# Patient Record
Sex: MALE | Race: WHITE | Employment: FULL TIME | URBAN - NONMETROPOLITAN AREA
[De-identification: names, ages, dates, MRNs, and addresses within clinical notes are randomized per-mention and may not be internally consistent; named-entity substitution may affect disease eponyms.]

---

## 2021-05-03 ENCOUNTER — OFFICE VISIT (OUTPATIENT)
Dept: FAMILY MEDICINE CLINIC | Age: 36
End: 2021-05-03
Payer: OTHER GOVERNMENT

## 2021-05-03 VITALS
HEIGHT: 73 IN | OXYGEN SATURATION: 96 % | HEART RATE: 77 BPM | TEMPERATURE: 97.1 F | BODY MASS INDEX: 25.84 KG/M2 | SYSTOLIC BLOOD PRESSURE: 114 MMHG | RESPIRATION RATE: 18 BRPM | DIASTOLIC BLOOD PRESSURE: 78 MMHG | WEIGHT: 195 LBS

## 2021-05-03 DIAGNOSIS — U07.1 COVID-19: Primary | ICD-10-CM

## 2021-05-03 DIAGNOSIS — J01.10 ACUTE NON-RECURRENT FRONTAL SINUSITIS: ICD-10-CM

## 2021-05-03 LAB
Lab: NORMAL
PERFORMING INSTRUMENT: NORMAL
QC PASS/FAIL: NORMAL
SARS-COV-2, POC: NORMAL

## 2021-05-03 PROCEDURE — 99213 OFFICE O/P EST LOW 20 MIN: CPT | Performed by: FAMILY MEDICINE

## 2021-05-03 PROCEDURE — 87426 SARSCOV CORONAVIRUS AG IA: CPT | Performed by: FAMILY MEDICINE

## 2021-05-03 RX ORDER — METHYLPREDNISOLONE 4 MG/1
TABLET ORAL
Qty: 1 KIT | Refills: 0 | Status: SHIPPED | OUTPATIENT
Start: 2021-05-03 | End: 2022-07-26

## 2021-05-03 RX ORDER — CEFDINIR 300 MG/1
300 CAPSULE ORAL 2 TIMES DAILY
Qty: 20 CAPSULE | Refills: 0 | Status: SHIPPED | OUTPATIENT
Start: 2021-05-03 | End: 2021-05-13

## 2021-05-03 RX ORDER — CETIRIZINE HYDROCHLORIDE 10 MG/1
10 TABLET ORAL DAILY
Qty: 30 TABLET | Refills: 1 | Status: SHIPPED | OUTPATIENT
Start: 2021-05-03

## 2021-05-03 ASSESSMENT — ENCOUNTER SYMPTOMS
SINUS PAIN: 1
SINUS PRESSURE: 1
RESPIRATORY NEGATIVE: 1
EYES NEGATIVE: 1
SORE THROAT: 0
RHINORRHEA: 1

## 2021-05-03 NOTE — PROGRESS NOTES
5/3/21  Sharan Francois : 1985 Sex: male  Age: 31 y.o. Chief Complaint   Patient presents with    Nasal Congestion    Drainage       Is a 62-year-old white male with chief complaint nasal congestion drainage and dental pain secondary to the sinus pain. He has no fevers chills cough shortness of breath none loss of taste or smell no nausea vomiting or diarrhea. Review of Systems   Constitutional: Positive for fatigue. HENT: Positive for congestion, postnasal drip, rhinorrhea, sinus pressure and sinus pain. Negative for sneezing and sore throat. Eyes: Negative. Respiratory: Negative. Cardiovascular: Negative. Current Outpatient Medications:     cetirizine (ZYRTEC) 10 MG tablet, Take 1 tablet by mouth daily, Disp: 30 tablet, Rfl: 1    methylPREDNISolone (MEDROL DOSEPACK) 4 MG tablet, Take by mouth., Disp: 1 kit, Rfl: 0    cefdinir (OMNICEF) 300 MG capsule, Take 1 capsule by mouth 2 times daily for 10 days, Disp: 20 capsule, Rfl: 0  No Known Allergies    No past medical history on file. No past surgical history on file. No family history on file. Social History     Tobacco Use    Smoking status: Not on file   Substance Use Topics    Alcohol use: Not on file    Drug use: Not on file        Vitals:    21 1459   BP: 114/78   Pulse: 77   Resp: 18   Temp: 97.1 °F (36.2 °C)   SpO2: 96%   Weight: 195 lb (88.5 kg)   Height: 6' 1\" (1.854 m)       Physical Exam  Vitals signs reviewed. HENT:      Head: Normocephalic and atraumatic. Right Ear: Tympanic membrane, ear canal and external ear normal. There is no impacted cerumen. Left Ear: Tympanic membrane, ear canal and external ear normal. There is no impacted cerumen. Nose: Congestion and rhinorrhea present. Mouth/Throat:      Mouth: Mucous membranes are moist.      Pharynx: Oropharynx is clear. No oropharyngeal exudate or posterior oropharyngeal erythema.    Eyes:      Conjunctiva/sclera: Conjunctivae normal.   Cardiovascular:      Rate and Rhythm: Normal rate and regular rhythm. Heart sounds: No murmur. Pulmonary:      Effort: Pulmonary effort is normal.      Breath sounds: Normal breath sounds. Lymphadenopathy:      Cervical: No cervical adenopathy. Neurological:      Mental Status: He is alert. Assessment and Plan:  Jd Laws was seen today for nasal congestion and drainage. Diagnoses and all orders for this visit:    COVID-19  -     POCT COVID-19, Antigen  -     COVID-19 Ambulatory    Acute non-recurrent frontal sinusitis    Other orders  -     cetirizine (ZYRTEC) 10 MG tablet; Take 1 tablet by mouth daily  -     methylPREDNISolone (MEDROL DOSEPACK) 4 MG tablet; Take by mouth. -     cefdinir (OMNICEF) 300 MG capsule; Take 1 capsule by mouth 2 times daily for 10 days        Orders Placed This Encounter   Medications    cetirizine (ZYRTEC) 10 MG tablet     Sig: Take 1 tablet by mouth daily     Dispense:  30 tablet     Refill:  1    methylPREDNISolone (MEDROL DOSEPACK) 4 MG tablet     Sig: Take by mouth. Dispense:  1 kit     Refill:  0    cefdinir (OMNICEF) 300 MG capsule     Sig: Take 1 capsule by mouth 2 times daily for 10 days     Dispense:  20 capsule     Refill:  0        Patient advised to follow up with PCP as needed. Seen By:  Naila Gallo, DO  His COVID-19 rapid swab was negative we did a PCR on him and treated him for acute sinusitis further treatment is pending his PCR. We told him he has to isolate until we have a negative PCR.

## 2021-05-05 LAB
SARS-COV-2: NOT DETECTED
SOURCE: NORMAL

## 2022-05-08 ENCOUNTER — APPOINTMENT (OUTPATIENT)
Dept: GENERAL RADIOLOGY | Age: 37
End: 2022-05-08
Payer: OTHER GOVERNMENT

## 2022-05-08 ENCOUNTER — HOSPITAL ENCOUNTER (EMERGENCY)
Age: 37
Discharge: HOME | End: 2022-05-08
Attending: STUDENT IN AN ORGANIZED HEALTH CARE EDUCATION/TRAINING PROGRAM
Payer: OTHER GOVERNMENT

## 2022-05-08 VITALS
RESPIRATION RATE: 14 BRPM | DIASTOLIC BLOOD PRESSURE: 83 MMHG | WEIGHT: 205 LBS | HEIGHT: 73 IN | TEMPERATURE: 97.5 F | OXYGEN SATURATION: 99 % | SYSTOLIC BLOOD PRESSURE: 138 MMHG | HEART RATE: 80 BPM | BODY MASS INDEX: 27.17 KG/M2

## 2022-05-08 DIAGNOSIS — L03.113 CELLULITIS OF HAND, RIGHT: Primary | ICD-10-CM

## 2022-05-08 LAB
ANION GAP SERPL CALCULATED.3IONS-SCNC: 9 MMOL/L (ref 7–16)
BASOPHILS ABSOLUTE: 0.03 E9/L (ref 0–0.2)
BASOPHILS RELATIVE PERCENT: 0.4 % (ref 0–2)
BUN BLDV-MCNC: 19 MG/DL (ref 6–20)
CALCIUM SERPL-MCNC: 9.3 MG/DL (ref 8.6–10.2)
CHLORIDE BLD-SCNC: 104 MMOL/L (ref 98–107)
CO2: 26 MMOL/L (ref 22–29)
CREAT SERPL-MCNC: 1.1 MG/DL (ref 0.7–1.2)
EOSINOPHILS ABSOLUTE: 0.37 E9/L (ref 0.05–0.5)
EOSINOPHILS RELATIVE PERCENT: 4.3 % (ref 0–6)
GFR AFRICAN AMERICAN: >60
GFR NON-AFRICAN AMERICAN: >60 ML/MIN/1.73
GLUCOSE BLD-MCNC: 99 MG/DL (ref 74–99)
HCT VFR BLD CALC: 46.3 % (ref 37–54)
HEMOGLOBIN: 15.8 G/DL (ref 12.5–16.5)
IMMATURE GRANULOCYTES #: 0.03 E9/L
IMMATURE GRANULOCYTES %: 0.4 % (ref 0–5)
LACTIC ACID, SEPSIS: 1.2 MMOL/L (ref 0.5–1.9)
LYMPHOCYTES ABSOLUTE: 1.78 E9/L (ref 1.5–4)
LYMPHOCYTES RELATIVE PERCENT: 20.9 % (ref 20–42)
MCH RBC QN AUTO: 30.5 PG (ref 26–35)
MCHC RBC AUTO-ENTMCNC: 34.1 % (ref 32–34.5)
MCV RBC AUTO: 89.4 FL (ref 80–99.9)
MONOCYTES ABSOLUTE: 0.73 E9/L (ref 0.1–0.95)
MONOCYTES RELATIVE PERCENT: 8.6 % (ref 2–12)
NEUTROPHILS ABSOLUTE: 5.57 E9/L (ref 1.8–7.3)
NEUTROPHILS RELATIVE PERCENT: 65.4 % (ref 43–80)
PDW BLD-RTO: 12.5 FL (ref 11.5–15)
PLATELET # BLD: 149 E9/L (ref 130–450)
PMV BLD AUTO: 11.2 FL (ref 7–12)
POTASSIUM REFLEX MAGNESIUM: 4.3 MMOL/L (ref 3.5–5)
RBC # BLD: 5.18 E12/L (ref 3.8–5.8)
SODIUM BLD-SCNC: 139 MMOL/L (ref 132–146)
WBC # BLD: 8.5 E9/L (ref 4.5–11.5)

## 2022-05-08 PROCEDURE — 87040 BLOOD CULTURE FOR BACTERIA: CPT

## 2022-05-08 PROCEDURE — 90471 IMMUNIZATION ADMIN: CPT | Performed by: STUDENT IN AN ORGANIZED HEALTH CARE EDUCATION/TRAINING PROGRAM

## 2022-05-08 PROCEDURE — 80048 BASIC METABOLIC PNL TOTAL CA: CPT

## 2022-05-08 PROCEDURE — 99284 EMERGENCY DEPT VISIT MOD MDM: CPT

## 2022-05-08 PROCEDURE — 73130 X-RAY EXAM OF HAND: CPT

## 2022-05-08 PROCEDURE — 85025 COMPLETE CBC W/AUTO DIFF WBC: CPT

## 2022-05-08 PROCEDURE — 83605 ASSAY OF LACTIC ACID: CPT

## 2022-05-08 PROCEDURE — 90714 TD VACC NO PRESV 7 YRS+ IM: CPT | Performed by: STUDENT IN AN ORGANIZED HEALTH CARE EDUCATION/TRAINING PROGRAM

## 2022-05-08 PROCEDURE — 6360000002 HC RX W HCPCS: Performed by: STUDENT IN AN ORGANIZED HEALTH CARE EDUCATION/TRAINING PROGRAM

## 2022-05-08 RX ORDER — DOXYCYCLINE HYCLATE 100 MG
100 TABLET ORAL 2 TIMES DAILY
Qty: 14 TABLET | Refills: 0 | Status: SHIPPED | OUTPATIENT
Start: 2022-05-08 | End: 2022-05-15

## 2022-05-08 RX ORDER — TETANUS AND DIPHTHERIA TOXOIDS ADSORBED 2; 2 [LF]/.5ML; [LF]/.5ML
0.5 INJECTION INTRAMUSCULAR ONCE
Status: COMPLETED | OUTPATIENT
Start: 2022-05-08 | End: 2022-05-08

## 2022-05-08 RX ADMIN — TETANUS AND DIPHTHERIA TOXOIDS ADSORBED 0.5 ML: 2; 2 INJECTION INTRAMUSCULAR at 12:10

## 2022-05-08 ASSESSMENT — ENCOUNTER SYMPTOMS
WHEEZING: 0
ABDOMINAL PAIN: 0
SINUS PRESSURE: 0
SORE THROAT: 0
EYE PAIN: 0
BACK PAIN: 0
NAUSEA: 0
VOMITING: 0
DIARRHEA: 0
COUGH: 0
SHORTNESS OF BREATH: 0
EYE DISCHARGE: 0

## 2022-05-08 NOTE — ED PROVIDER NOTES
29-year-old male presenting emerged part for wound check. He went to a dermatologist 3 days ago to get an injection in his right hand as he has some warts there, and yesterday began developing some on and redness and edema, went to an urgent care and was given a prescription for Bactrim and Keflex, markings were made and he was instructed to return to the emergency department if the erythema seemed to spread past the markings. Patient states the streaking and swelling has gotten better, but he noted the erythema seem to spread a little bit which is why he came to the emergency department today. Denies any fevers, chills, nausea, vomiting, diarrhea, chest pain, chest tightness, shortness of breath, cough, congestion. Right hand erythema and swelling began yesterday, has improved for the most part, seems to improve with elevation of his hand along with taking his antibiotics, worsened by nothing, moderate in severity, has been persistent. Review of Systems   Constitutional: Negative for chills and fever. HENT: Negative for ear pain, sinus pressure and sore throat. Eyes: Negative for pain and discharge. Respiratory: Negative for cough, shortness of breath and wheezing. Cardiovascular: Negative for chest pain. Gastrointestinal: Negative for abdominal pain, diarrhea, nausea and vomiting. Genitourinary: Negative for dysuria and frequency. Musculoskeletal: Negative for arthralgias and back pain. Skin: Positive for wound (Right hand erythema, cellulitis). Negative for rash. Neurological: Negative for weakness and headaches. Hematological: Negative for adenopathy. All other systems reviewed and are negative. Physical Exam  Vitals and nursing note reviewed. Constitutional:       Appearance: He is well-developed. HENT:      Head: Normocephalic and atraumatic.    Eyes:      Conjunctiva/sclera: Conjunctivae normal.   Cardiovascular:      Rate and Rhythm: Normal rate and regular rhythm. Pulses: Normal pulses. Heart sounds: Normal heart sounds. No murmur heard. Pulmonary:      Effort: Pulmonary effort is normal. No respiratory distress. Breath sounds: Normal breath sounds. No wheezing or rales. Abdominal:      General: Bowel sounds are normal.      Palpations: Abdomen is soft. Tenderness: There is no abdominal tenderness. There is no guarding or rebound. Musculoskeletal:         General: Swelling present. No tenderness or deformity. Normal range of motion. Cervical back: Normal range of motion and neck supple. Comments: Mild right hand erythema, there is no warmth, no fluctuance, no induration, no crepitus, no streaking visible, the erythema of the apparent cellulitis has spread around 1 cm past the markings from yesterday in an area that he said had streaks the day before, streaking is no longer present. Patient is also complaining of right armpit discomfort but states he was elevating his arm for over 20 hours, no visible deformity, no tenderness, no lymphadenopathy in the axilla   Skin:     General: Skin is warm and dry. Neurological:      Mental Status: He is alert and oriented to person, place, and time. Cranial Nerves: No cranial nerve deficit. Coordination: Coordination normal.      Comments: Normal pulses, right hand neurovascularly intact          Procedures     MDM     ED Course as of 05/08/22 1326   Sun May 08, 2022   1048 ATTENDING PROVIDER ATTESTATION:     I have personally performed and/or participated in the history, exam, medical decision making, and procedures and agree with all pertinent clinical information unless otherwise noted. I have also reviewed and agree with the past medical, family and social history unless otherwise noted. I have discussed this patient in detail with the resident, and provided the instruction and education regarding the patient.   My findings/plan:   -year-old male with no segment past medical history presenting for evaluation of hand swelling and discomfort. Patient notes that he saw dermatology, had something injected in his hand to help with warts however started as infection. He went to urgent care, was started on Keflex and Bactrim for this. They outlined the area, he notes it seems like it has spread so he presented for further evaluation treatment. He states he is only been on antibiotics for 20 hours at this point time. On exam he is sitting up in bed no acute distress. He is neurovascular intact. Plan for labs, imaging, supportive care. [BB]   1156 No visualized foreign body or signs of vasculitis on hand x-ray, patient is nontoxic-appearing, is not diabetic, does not have a significant leukocytosis, does not have any systemic symptoms consistent with severe cellulitis. Will discharge patient, change Bactrim to doxycycline, return precautions given. [JG]   152 82-year-old male presenting emergency department right hand cellulitis, was concerned today worsened compared to yesterday when he went to an urgent care and received some antibiotics. Erythema and swelling appeared greatly improved based off of description he was describing, he states the swelling had improved, there was a small amount of erythema past the borders that were drawn at the urgent care, but streaking was no longer present, there is no palpable crepitus, induration, fluctuance, no significant warmth, was neurovascularly intact in that hand. Given he had injections in the hand the dermatology office obtain x-ray to rule out foreign body, obtain blood work which did not reveal any evidence of serious underlying infection, patient was afebrile during emergency department stay, his tetanus was updated, his Bactrim was changed to doxycycline prescription, patient was discharged, return precautions given, instructed follow-up with primary care physician.  [JG]      ED Course User Index  [BB] Beatriz Pan, DO  [JG] Johnny Acuña MD            65-year-old male presenting emergency department right hand cellulitis, was concerned today worsened compared to yesterday when he went to an urgent care and received some antibiotics. Erythema and swelling appeared greatly improved based off of description he was describing, he states the swelling had improved, there was a small amount of erythema past the borders that were drawn at the urgent care, but streaking was no longer present, there is no palpable crepitus, induration, fluctuance, no significant warmth, was neurovascularly intact in that hand. Given he had injections in the hand the dermatology office obtain x-ray to rule out foreign body, obtain blood work which did not reveal any evidence of serious underlying infection, patient was afebrile during emergency department stay, his tetanus was updated, his Bactrim was changed to doxycycline prescription, patient was discharged, return precautions given, instructed follow-up with primary care physician. ED Course as of 05/08/22 1326   Sun May 08, 2022   1048 ATTENDING PROVIDER ATTESTATION:     I have personally performed and/or participated in the history, exam, medical decision making, and procedures and agree with all pertinent clinical information unless otherwise noted. I have also reviewed and agree with the past medical, family and social history unless otherwise noted. I have discussed this patient in detail with the resident, and provided the instruction and education regarding the patient. My findings/plan:   -year-old male with no segment past medical history presenting for evaluation of hand swelling and discomfort. Patient notes that he saw dermatology, had something injected in his hand to help with warts however started as infection. He went to urgent care, was started on Keflex and Bactrim for this.   They outlined the area, he notes it seems like it has spread so he presented for further evaluation treatment. He states he is only been on antibiotics for 20 hours at this point time. On exam he is sitting up in bed no acute distress. He is neurovascular intact. Plan for labs, imaging, supportive care. [BB]   1156 No visualized foreign body or signs of vasculitis on hand x-ray, patient is nontoxic-appearing, is not diabetic, does not have a significant leukocytosis, does not have any systemic symptoms consistent with severe cellulitis. Will discharge patient, change Bactrim to doxycycline, return precautions given. [JG]   152 44-year-old male presenting emergency department right hand cellulitis, was concerned today worsened compared to yesterday when he went to an urgent care and received some antibiotics. Erythema and swelling appeared greatly improved based off of description he was describing, he states the swelling had improved, there was a small amount of erythema past the borders that were drawn at the urgent care, but streaking was no longer present, there is no palpable crepitus, induration, fluctuance, no significant warmth, was neurovascularly intact in that hand. Given he had injections in the hand the dermatology office obtain x-ray to rule out foreign body, obtain blood work which did not reveal any evidence of serious underlying infection, patient was afebrile during emergency department stay, his tetanus was updated, his Bactrim was changed to doxycycline prescription, patient was discharged, return precautions given, instructed follow-up with primary care physician. [JG]      ED Course User Index  [BB] Alexander Oliva DO  [JG] Rj Piedra MD       --------------------------------------------- PAST HISTORY ---------------------------------------------  Past Medical History:  has no past medical history on file. Past Surgical History:  has no past surgical history on file. Social History:      Family History: family history is not on file.      The patients home medications have been reviewed. Allergies: Patient has no known allergies. -------------------------------------------------- RESULTS -------------------------------------------------  Labs:  Results for orders placed or performed during the hospital encounter of 05/08/22   CBC with Auto Differential   Result Value Ref Range    WBC 8.5 4.5 - 11.5 E9/L    RBC 5.18 3.80 - 5.80 E12/L    Hemoglobin 15.8 12.5 - 16.5 g/dL    Hematocrit 46.3 37.0 - 54.0 %    MCV 89.4 80.0 - 99.9 fL    MCH 30.5 26.0 - 35.0 pg    MCHC 34.1 32.0 - 34.5 %    RDW 12.5 11.5 - 15.0 fL    Platelets 263 021 - 802 E9/L    MPV 11.2 7.0 - 12.0 fL    Neutrophils % 65.4 43.0 - 80.0 %    Immature Granulocytes % 0.4 0.0 - 5.0 %    Lymphocytes % 20.9 20.0 - 42.0 %    Monocytes % 8.6 2.0 - 12.0 %    Eosinophils % 4.3 0.0 - 6.0 %    Basophils % 0.4 0.0 - 2.0 %    Neutrophils Absolute 5.57 1.80 - 7.30 E9/L    Immature Granulocytes # 0.03 E9/L    Lymphocytes Absolute 1.78 1.50 - 4.00 E9/L    Monocytes Absolute 0.73 0.10 - 0.95 E9/L    Eosinophils Absolute 0.37 0.05 - 0.50 E9/L    Basophils Absolute 0.03 0.00 - 0.20 F7/K   Basic Metabolic Panel w/ Reflex to MG   Result Value Ref Range    Sodium 139 132 - 146 mmol/L    Potassium reflex Magnesium 4.3 3.5 - 5.0 mmol/L    Chloride 104 98 - 107 mmol/L    CO2 26 22 - 29 mmol/L    Anion Gap 9 7 - 16 mmol/L    Glucose 99 74 - 99 mg/dL    BUN 19 6 - 20 mg/dL    CREATININE 1.1 0.7 - 1.2 mg/dL    GFR Non-African American >60 >=60 mL/min/1.73    GFR African American >60     Calcium 9.3 8.6 - 10.2 mg/dL   Lactate, Sepsis   Result Value Ref Range    Lactic Acid, Sepsis 1.2 0.5 - 1.9 mmol/L       Radiology:  XR HAND RIGHT (MIN 3 VIEWS)   Final Result   No acute osseous abnormality. Soft tissue swelling at the dorsal aspect of the hand. No radiographic   evidence of osteomyelitis. No radiopaque foreign body seen.              ------------------------- NURSING NOTES AND VITALS REVIEWED ---------------------------  Date / Time Roomed:  5/8/2022 10:15 AM  ED Bed Assignment:  27/27    The nursing notes within the ED encounter and vital signs as below have been reviewed. /83   Pulse 80   Temp 97.5 °F (36.4 °C) (Temporal)   Resp 14   Ht 6' 1\" (1.854 m)   Wt 205 lb (93 kg)   SpO2 99%   BMI 27.05 kg/m²   Oxygen Saturation Interpretation: Normal      ------------------------------------------ PROGRESS NOTES ------------------------------------------  1:26 PM EDT  I have spoken with the patient and discussed todays results, in addition to providing specific details for the plan of care and counseling regarding the diagnosis and prognosis. Their questions are answered at this time and they are agreeable with the plan. I discussed at length with them reasons for immediate return here for re evaluation. They will followup with their primary care physician by calling their office tomorrow. --------------------------------- ADDITIONAL PROVIDER NOTES ---------------------------------  At this time the patient is without objective evidence of an acute process requiring hospitalization or inpatient management. They have remained hemodynamically stable throughout their entire ED visit and are stable for discharge with outpatient follow-up. The plan has been discussed in detail and they are aware of the specific conditions for emergent return, as well as the importance of follow-up. Discharge Medication List as of 5/8/2022 11:58 AM      START taking these medications    Details   doxycycline hyclate (VIBRA-TABS) 100 MG tablet Take 1 tablet by mouth 2 times daily for 7 days, Disp-14 tablet, R-0Normal             Diagnosis:  1. Cellulitis of hand, right        Disposition:  Patient's disposition: Discharge to home  Patient's condition is stable.          Kim Petit MD  Resident  05/08/22 8683

## 2022-05-13 LAB
BLOOD CULTURE, ROUTINE: NORMAL
CULTURE, BLOOD 2: NORMAL

## 2022-06-20 ENCOUNTER — TELEPHONE (OUTPATIENT)
Dept: FAMILY MEDICINE CLINIC | Age: 37
End: 2022-06-20

## 2022-06-20 NOTE — TELEPHONE ENCOUNTER
----- Message from Raquel Grey sent at 2022 10:18 AM EDT -----  Subject: Appointment Request    Reason for Call: New Patient Request Appointment    QUESTIONS  Type of Appointment? New Patient/New to Provider  Reason for appointment request? No appointments available during search  Additional Information for Provider? Patient would like to establish care   with Demetria Arroyo if possible. Patient is also open to establish care   with any male doctor with in the [facility]. Please reach out to the   patient to schedule.   ---------------------------------------------------------------------------  --------------  CALL BACK INFO  What is the best way for the office to contact you? OK to leave message on   voicemail  Preferred Call Back Phone Number? [phone]  ---------------------------------------------------------------------------  --------------  SCRIPT ANSWERS  Relationship to Patient? Self  Specialty Confirmation? Primary Care  Is this the first appointment to establish care for a ? No  Have you been diagnosed with COVID-19 in the past 10 days? No  (Service Expert  click yes below to proceed with [employer] As Usual   Scheduling)?  Yes

## 2022-07-26 ENCOUNTER — OFFICE VISIT (OUTPATIENT)
Dept: FAMILY MEDICINE CLINIC | Age: 37
End: 2022-07-26
Payer: OTHER GOVERNMENT

## 2022-07-26 VITALS
OXYGEN SATURATION: 98 % | SYSTOLIC BLOOD PRESSURE: 100 MMHG | DIASTOLIC BLOOD PRESSURE: 80 MMHG | BODY MASS INDEX: 27.3 KG/M2 | HEIGHT: 73 IN | RESPIRATION RATE: 18 BRPM | HEART RATE: 60 BPM | WEIGHT: 206 LBS | TEMPERATURE: 97.8 F

## 2022-07-26 DIAGNOSIS — Z00.00 ROUTINE GENERAL MEDICAL EXAMINATION AT A HEALTH CARE FACILITY: Primary | ICD-10-CM

## 2022-07-26 PROCEDURE — 99385 PREV VISIT NEW AGE 18-39: CPT | Performed by: INTERNAL MEDICINE

## 2022-07-26 SDOH — ECONOMIC STABILITY: FOOD INSECURITY: WITHIN THE PAST 12 MONTHS, THE FOOD YOU BOUGHT JUST DIDN'T LAST AND YOU DIDN'T HAVE MONEY TO GET MORE.: PATIENT DECLINED

## 2022-07-26 SDOH — ECONOMIC STABILITY: FOOD INSECURITY: WITHIN THE PAST 12 MONTHS, YOU WORRIED THAT YOUR FOOD WOULD RUN OUT BEFORE YOU GOT MONEY TO BUY MORE.: PATIENT DECLINED

## 2022-07-26 ASSESSMENT — ENCOUNTER SYMPTOMS
SHORTNESS OF BREATH: 0
BLOOD IN STOOL: 0
CHEST TIGHTNESS: 0
SORE THROAT: 0
EYE PAIN: 0
ABDOMINAL PAIN: 0
COUGH: 0
DIARRHEA: 0
NAUSEA: 0
VOMITING: 0
CONSTIPATION: 0
RHINORRHEA: 0

## 2022-07-26 ASSESSMENT — PATIENT HEALTH QUESTIONNAIRE - PHQ9
SUM OF ALL RESPONSES TO PHQ QUESTIONS 1-9: 0
SUM OF ALL RESPONSES TO PHQ QUESTIONS 1-9: 0
2. FEELING DOWN, DEPRESSED OR HOPELESS: 0
SUM OF ALL RESPONSES TO PHQ QUESTIONS 1-9: 0
SUM OF ALL RESPONSES TO PHQ9 QUESTIONS 1 & 2: 0
SUM OF ALL RESPONSES TO PHQ QUESTIONS 1-9: 0
1. LITTLE INTEREST OR PLEASURE IN DOING THINGS: 0

## 2022-07-26 ASSESSMENT — SOCIAL DETERMINANTS OF HEALTH (SDOH): HOW HARD IS IT FOR YOU TO PAY FOR THE VERY BASICS LIKE FOOD, HOUSING, MEDICAL CARE, AND HEATING?: PATIENT DECLINED

## 2023-01-05 ENCOUNTER — TELEPHONE (OUTPATIENT)
Dept: PRIMARY CARE CLINIC | Age: 38
End: 2023-01-05

## 2023-01-05 NOTE — TELEPHONE ENCOUNTER
I spoke with Pt and relayed Dr. Peyton Pino message. His insurance has changed to [employer]. He would like to change providers. He will call back.

## 2023-01-05 NOTE — TELEPHONE ENCOUNTER
----- Message from Susy Cabrera sent at 1/4/2023  3:46 PM EST -----  Subject: Appointment Request    Reason for Call: New Patient/New to Provider Appointment needed: New   Patient Request Appointment    QUESTIONS    Reason for appointment request? Requested Provider unavailable - Dr. Kimberly Singh     Additional Information for Provider? Robbin Maldonado is needing to establish   care with Dr. Kimberly Singh. He is a former patient of Dr. Luca Go. There were no   available appointments.   ---------------------------------------------------------------------------  --------------  Dee Powell VYHQ  [phone]; OK to leave message on voicemail  ---------------------------------------------------------------------------  --------------  SCRIPT ANSWERS  PARAG Screen: Allen Tomlin

## 2023-02-17 ENCOUNTER — OFFICE VISIT (OUTPATIENT)
Dept: SURGERY | Age: 38
End: 2023-02-17
Payer: OTHER GOVERNMENT

## 2023-02-17 VITALS
OXYGEN SATURATION: 99 % | SYSTOLIC BLOOD PRESSURE: 113 MMHG | BODY MASS INDEX: 26.9 KG/M2 | WEIGHT: 203 LBS | DIASTOLIC BLOOD PRESSURE: 72 MMHG | HEART RATE: 41 BPM | HEIGHT: 73 IN

## 2023-02-17 DIAGNOSIS — K62.5 BRIGHT RED BLOOD PER RECTUM: Primary | ICD-10-CM

## 2023-02-17 PROCEDURE — 99203 OFFICE O/P NEW LOW 30 MIN: CPT | Performed by: SURGERY

## 2023-02-17 NOTE — PROGRESS NOTES
[facility] Clinic Note    Assessment/Plan:      Diagnosis Orders   1. Bright red blood per rectum      Had colonoscopy 2-3 years ago for same which just showed hemorrhoids. Likely benign. Monitor given has resolved for 3 months. If recurs consider colonoscopy            Return if symptoms worsen or fail to improve. Followup offered, but would just like to call. .      Chief Complaint   Patient presents with    New Patient     Ref from dr Des Montoya for rectal bleeding       PCP: Maria C Batista MD    HPI: Jessy Glass is a 41 y.o. male who presents in consultation for rectal bleeding. He had it intermittently for a couple of months. Stopped 3 months ago without any recurrence. He did have a prior colonoscopy for rectal bleeding around 2 to 3 years ago. This was with Dr. Virginie Thomas he states. He only had internal hemorrhoids noted. He denies any rectal pain. He has no issues moving his bowels. He has no melena. He says it is generally just when he wipes on the toilet tissue. He did notice in the toilet bowl once. He denies any abdominal pain or unintentional weight loss. Maternal grandmother has a history of colon cancer. There is no family history of inflammatory bowel disease. History reviewed. No pertinent past medical history. Past Surgical History:   Procedure Laterality Date    COLONOSCOPY      NASAL FRACTURE SURGERY      WISDOM TOOTH EXTRACTION         Prior to Admission medications    Medication Sig Start Date End Date Taking?  Authorizing Provider   FINASTERIDE PO Take by mouth   Yes Historical Provider, MD   cetirizine (ZYRTEC) 10 MG tablet Take 1 tablet by mouth daily 5/3/21  Yes Pamela Dhillon, DO       No Known Allergies    Social History     Socioeconomic History    Marital status:      Spouse name: None    Number of children: 2    Years of education: None    Highest education level: None   Tobacco Use    Smoking status: Never    Smokeless tobacco: Never   Vaping Use Vaping Use: Never used   Substance and Sexual Activity    Alcohol use: Yes     Alcohol/week: 2.0 standard drinks     Types: 2 Cans of beer per week    Drug use: Never    Sexual activity: Yes     Partners: Female     Social Determinants of Health     Financial Resource Strain: Unknown    Difficulty of Paying Living Expenses: Patient refused   Food Insecurity: Unknown    Worried About Running Out of Food in the Last Year: Patient refused    Ran Out of Food in the Last Year: Patient refused       Family History   Problem Relation Age of Onset    Thyroid Disease Mother     No Known Problems Father     No Known Problems Sister     No Known Problems Sister     No Known Problems Sister     No Known Problems Brother     Colon Cancer Maternal Grandmother     Diabetes type 2  Maternal Grandmother     Heart Disease Maternal Grandmother     Prostate Cancer Maternal Grandfather     Dementia Paternal Grandmother     Prostate Cancer Paternal Grandfather        Review of Systems   All other systems reviewed and are negative. Objective:  Vitals:    02/17/23 1103   BP: 113/72   Pulse: (!) 41   SpO2: 99%   Weight: 203 lb (92.1 kg)   Height: 6' 1\" (1.854 m)          Physical Exam  Constitutional:       General: He is not in acute distress. Appearance: He is not diaphoretic. HENT:      Head: Normocephalic and atraumatic. Eyes:      General:         Right eye: No discharge. Left eye: No discharge. Neck:      Trachea: No tracheal deviation. Cardiovascular:      Rate and Rhythm: Normal rate. Pulmonary:      Effort: Pulmonary effort is normal. No respiratory distress. Abdominal:      General: There is no distension. Palpations: Abdomen is soft. Tenderness: There is no abdominal tenderness. There is no guarding or rebound. Genitourinary:     Comments: Normal aside from small internal hemorrhoids. Skin:     General: Skin is warm and dry.    Neurological:      Mental Status: He is alert and oriented to person, place, and time. Valeriy oGins MD    I have examined the patient and performed the key aspects of physical exam, reviewed the record (including all pertinent and new radiology images and laboratory findings, referring provider notes and results), and discussed the case with the primary and referring provider where applicable. NOTE: This report, in part or full,may have been transcribed using voice recognition software. Every effort was made to ensure accuracy; however, inadvertent computerized transcription errors may be present. Please excuse any transcriptional grammatical or spelling errors that may have escaped my editorial review.     CC: Desirae Steven MD

## 2023-08-03 ENCOUNTER — OFFICE VISIT (OUTPATIENT)
Dept: FAMILY MEDICINE CLINIC | Age: 38
End: 2023-08-03
Payer: OTHER GOVERNMENT

## 2023-08-03 VITALS
OXYGEN SATURATION: 99 % | HEART RATE: 79 BPM | DIASTOLIC BLOOD PRESSURE: 78 MMHG | SYSTOLIC BLOOD PRESSURE: 130 MMHG | TEMPERATURE: 98.1 F | RESPIRATION RATE: 18 BRPM | HEIGHT: 73 IN | BODY MASS INDEX: 27.7 KG/M2 | WEIGHT: 209 LBS

## 2023-08-03 DIAGNOSIS — J01.40 ACUTE NON-RECURRENT PANSINUSITIS: Primary | ICD-10-CM

## 2023-08-03 DIAGNOSIS — R35.0 URINARY FREQUENCY: ICD-10-CM

## 2023-08-03 DIAGNOSIS — J02.0 STREP PHARYNGITIS: ICD-10-CM

## 2023-08-03 LAB
BILIRUBIN, POC: NORMAL
BLOOD URINE, POC: NORMAL
CLARITY, POC: CLEAR
COLOR, POC: CLEAR
GLUCOSE URINE, POC: NORMAL
KETONES, POC: NORMAL
LEUKOCYTE EST, POC: NORMAL
Lab: 1359
NITRITE, POC: NORMAL
PERFORMING INSTRUMENT: NORMAL
PH, POC: 6.5
PROTEIN, POC: NORMAL
QC PASS/FAIL: NORMAL
S PYO AG THROAT QL: POSITIVE
SARS-COV-2, POC: NORMAL
SPECIFIC GRAVITY, POC: 1.01
UROBILINOGEN, POC: 0.2

## 2023-08-03 PROCEDURE — 87880 STREP A ASSAY W/OPTIC: CPT | Performed by: PHYSICIAN ASSISTANT

## 2023-08-03 PROCEDURE — 99213 OFFICE O/P EST LOW 20 MIN: CPT | Performed by: PHYSICIAN ASSISTANT

## 2023-08-03 PROCEDURE — 81002 URINALYSIS NONAUTO W/O SCOPE: CPT | Performed by: PHYSICIAN ASSISTANT

## 2023-08-03 PROCEDURE — 87426 SARSCOV CORONAVIRUS AG IA: CPT | Performed by: PHYSICIAN ASSISTANT

## 2023-08-03 RX ORDER — CEFDINIR 300 MG/1
300 CAPSULE ORAL 2 TIMES DAILY
Qty: 20 CAPSULE | Refills: 0 | Status: SHIPPED | OUTPATIENT
Start: 2023-08-03 | End: 2023-08-13

## 2023-08-03 SDOH — ECONOMIC STABILITY: HOUSING INSECURITY
IN THE LAST 12 MONTHS, WAS THERE A TIME WHEN YOU DID NOT HAVE A STEADY PLACE TO SLEEP OR SLEPT IN A SHELTER (INCLUDING NOW)?: NO

## 2023-08-03 SDOH — ECONOMIC STABILITY: INCOME INSECURITY: HOW HARD IS IT FOR YOU TO PAY FOR THE VERY BASICS LIKE FOOD, HOUSING, MEDICAL CARE, AND HEATING?: NOT HARD AT ALL

## 2023-08-03 SDOH — ECONOMIC STABILITY: FOOD INSECURITY: WITHIN THE PAST 12 MONTHS, YOU WORRIED THAT YOUR FOOD WOULD RUN OUT BEFORE YOU GOT MONEY TO BUY MORE.: NEVER TRUE

## 2023-08-03 SDOH — ECONOMIC STABILITY: FOOD INSECURITY: WITHIN THE PAST 12 MONTHS, THE FOOD YOU BOUGHT JUST DIDN'T LAST AND YOU DIDN'T HAVE MONEY TO GET MORE.: NEVER TRUE

## 2023-08-03 ASSESSMENT — PATIENT HEALTH QUESTIONNAIRE - PHQ9
SUM OF ALL RESPONSES TO PHQ9 QUESTIONS 1 & 2: 0
1. LITTLE INTEREST OR PLEASURE IN DOING THINGS: 0
SUM OF ALL RESPONSES TO PHQ QUESTIONS 1-9: 0
2. FEELING DOWN, DEPRESSED OR HOPELESS: 0
SUM OF ALL RESPONSES TO PHQ QUESTIONS 1-9: 0

## 2023-08-03 NOTE — PROGRESS NOTES
Chief Complaint       Cough (X 3 days, subjective fever, productive cough, Sudafed OTC) and Sinusitis      History of Present Illness   Source of history provided by: patient. Qi Floyd is a 41 y.o. old male presenting to the walk in clinic for evaluation of a productive cough, chest congestion, nasal congestion, sinus pressure, sore throat, and body aches which have been present for the past 3 days. Patient reports a subjective fever at home as well. Denies any loss of taste or smell, CP, dyspnea, LE edema, abdominal pain, vomiting, rash, or lethargy. Denies any hx of asthma. Patient denies recent sick exposures. Patient has been vaccinated for COVID-19. Patient has been taking Sudafed OTC without symptomatic relief. Patient is also complaining of urinary frequency for the past few days. Denies any dysuria or hematuria. Denies any concern for STDs. ROS    Unless otherwise stated in this report or unable to obtain because of the patient's clinical or mental status as evidenced by the medical record, this patients's positive and negative responses for Review of Systems, constitutional, psych, eyes, ENT, cardiovascular, respiratory, gastrointestinal, neurological, genitourinary, musculoskeletal, integument systems and systems related to the presenting problem are either stated in the preceding or were not pertinent or were negative for the symptoms and/or complaints related to the medical problem. Past Medical History:  has no past medical history on file. Past Surgical History:  has a past surgical history that includes Colonoscopy; Nasal fracture surgery; and [place] tooth extraction. Social History:  reports that he has never smoked. He has never used smokeless tobacco. He reports current alcohol use of about 2.0 standard drinks per week. He reports that he does not use drugs.   Family History: family history includes Colon Cancer in his maternal grandmother; Dementia in his paternal

## 2023-09-19 ENCOUNTER — EVALUATION (OUTPATIENT)
Dept: PHYSICAL THERAPY | Age: 38
End: 2023-09-19
Payer: OTHER GOVERNMENT

## 2023-09-19 DIAGNOSIS — S76.301D RIGHT HAMSTRING INJURY, SUBSEQUENT ENCOUNTER: Primary | ICD-10-CM

## 2023-09-19 PROCEDURE — 97161 PT EVAL LOW COMPLEX 20 MIN: CPT | Performed by: PHYSICAL THERAPIST

## 2023-09-19 PROCEDURE — 97140 MANUAL THERAPY 1/> REGIONS: CPT | Performed by: PHYSICAL THERAPIST

## 2023-09-19 PROCEDURE — 97014 ELECTRIC STIMULATION THERAPY: CPT | Performed by: PHYSICAL THERAPIST

## 2023-09-19 NOTE — PROGRESS NOTES
[address] [facility] [facility]   Phone: [phone]   Fax: [phone]           Date:  2023   Patient: Bao Leon  : 1985  MRN: [id number]  Referring Provider: No referring provider defined for this encounter. Medical Diagnosis:     Right hamstring pain  R hip pain    SUBJECTIVE:     Onset date: past 10 years    Onset:     Mechanism of Injury: Pt reports R HS pain got really bad about 5 months ago. Pt reports he would have an [employer] test that he thinks he hurt it in doing the test. Pt reports the whole hamstring hurts today, usually it is jut proximal.    Previous PT: dry needling before and stated it helped    Medical Management for Current Problem: none    Chief complaint:     Behavior: condition is waxing / Durwin Lively states it comes and goes. Running makes it worse. Pain:   Current: 0/10     Best: 0/10     Worst:5/10    Symptom Type/Quality: constant tightness, sharp pain when running   Location[de-identified] Hip:  hamstring on R leg, more  pain proximal but is along the whole muscle       Provoking Activities/Positions: running, walking long periods of time                  Relieving Activitie/Positions: rest     Disturbed Sleep: no     Imaging results: No results found. Past Medical History:  No past medical history on file. Past Surgical History:   Procedure Laterality Date    COLONOSCOPY      NASAL FRACTURE SURGERY      WISDOM TOOTH EXTRACTION         Medications:   Current Outpatient Medications   Medication Sig Dispense Refill    FINASTERIDE PO Take by mouth      cetirizine (ZYRTEC) 10 MG tablet Take 1 tablet by mouth daily 30 tablet 1     No current facility-administered medications for this visit. 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   Status: full time    Exercise regimen: running, basketball, golfing    Hobbies: none    Patient Goals: pain relief, return to prior activity    Contraindications/Precautions:
Antonio January, PT DPT, PT [id number]  Arthur Osullivan, SPT    Treatment Charges: Mins Units   Initial Evaluation 20 1   Re-Evaluation     Ther Exercise         TE     Manual Therapy     MT 10 1   Ther Activities        TA     Gait Training          GT     Neuro Re-education NR     Modalities 15 1   Non-Billable Service Time     Other     Total Time/Units 45 3

## 2023-11-02 ENCOUNTER — TREATMENT (OUTPATIENT)
Dept: PHYSICAL THERAPY | Age: 38
End: 2023-11-02

## 2023-11-02 DIAGNOSIS — S76.301D RIGHT HAMSTRING INJURY, SUBSEQUENT ENCOUNTER: Primary | ICD-10-CM

## 2023-11-13 ENCOUNTER — TREATMENT (OUTPATIENT)
Dept: PHYSICAL THERAPY | Age: 38
End: 2023-11-13
Payer: OTHER GOVERNMENT

## 2023-11-13 DIAGNOSIS — S76.301D RIGHT HAMSTRING INJURY, SUBSEQUENT ENCOUNTER: Primary | ICD-10-CM

## 2023-11-13 PROCEDURE — 97110 THERAPEUTIC EXERCISES: CPT | Performed by: PHYSICAL THERAPIST

## 2023-11-13 PROCEDURE — 97140 MANUAL THERAPY 1/> REGIONS: CPT | Performed by: PHYSICAL THERAPIST

## 2023-11-13 PROCEDURE — 97112 NEUROMUSCULAR REEDUCATION: CPT | Performed by: PHYSICAL THERAPIST

## 2023-11-13 NOTE — PROGRESS NOTES
determine if adverse affects are noted. WIll continue c procedure next session if no adverse reactions are found. Pt jogged on TM following DN portion of tx to assess pain level with jogging.      P: Continue with rehab plan    Atanacio Goldmann PT DPT [id number]  Pete Coleman, [facility]  Treatment Charges: Mins Units   Initial Evaluation     Re-Evaluation     Ther Exercise         TE 10 1   Manual Therapy     MT 10 1   Ther Activities        TA     Gait Training          GT     Neuro Re-education NR 20 1   Modalities     Non-Billable Service Time     Other     Total Time/Units 40 3

## 2023-12-04 ENCOUNTER — TREATMENT (OUTPATIENT)
Dept: PHYSICAL THERAPY | Age: 38
End: 2023-12-04
Payer: OTHER GOVERNMENT

## 2023-12-04 DIAGNOSIS — S76.301D RIGHT HAMSTRING INJURY, SUBSEQUENT ENCOUNTER: Primary | ICD-10-CM

## 2023-12-04 PROCEDURE — 97140 MANUAL THERAPY 1/> REGIONS: CPT | Performed by: PHYSICAL THERAPIST

## 2023-12-04 PROCEDURE — 97112 NEUROMUSCULAR REEDUCATION: CPT | Performed by: PHYSICAL THERAPIST

## 2023-12-04 PROCEDURE — 97110 THERAPEUTIC EXERCISES: CPT | Performed by: PHYSICAL THERAPIST

## 2023-12-04 NOTE — PROGRESS NOTES
[address] [facility]   Phone: [phone]   Fax: [phone]      Physical Therapy Daily Treatment Note    Date: 2023  Patient Name: Juan Grullon  : 1985   MRN: [id number]  DOInjury: 5 months ago  DOSx: N/A  Referring Provider: Wolfgang Boas,  [address],  [facility] Diagnosis:     R hamstring pain    Outcome Measure: LEFS 25%    S: Pt reports that he was able to complete workout yesterday c little to no pain in HS. He has mild stiffness today. O: Please refer to PT Eval  Time 873-521     Visit 4 Repeat outcome measure at mid point and end. Pain 0/10     Strength      Palpation      ROM      Modalities      DN + E-stim  Estim applied to dry needles to promote reduction of neural inflammation, & facilitate proper muscle activation 2023 NR   Dry Needling Hamstrings 2023 MT   Manual      23 MT   Stretch      IT band supine      HS supine      Quad Prone      Exercise       TE   Bike      Stretching HS, SKTC, hip adductor, IT band 10 min  TE   QS      SLR      SAQ      LAQ      Hamstring Curl       TG squats      TG calf raises      Step-ups - FWD      Step-ups - LAT      Step-ups - BWD        NMR To improve balance for safe community and home ambulation    Resisted walk      FWD      BKWD      lat      March      Side stepping      Retro walk      Heel to toe      A:  Tolerated well. Pt was tx today c DN and DN + E-stim to reduce neural inflammation and tissue tightness. Pt was educated on the risks associated with dry needling including pneumothorax and infection. The pts questions were answered and verbal consent given prior to service being provided. Hands were washed and gloves donned. D/t complaints of R HS pain needles were placed in R HS to decr global inflamation and decr local hypertonicity. No adverse reactions were experienced during or post session.   Pt was educated to monitor symptoms over the next

## 2023-12-27 ENCOUNTER — TREATMENT (OUTPATIENT)
Dept: PHYSICAL THERAPY | Age: 38
End: 2023-12-27
Payer: OTHER GOVERNMENT

## 2023-12-27 DIAGNOSIS — S76.301D RIGHT HAMSTRING INJURY, SUBSEQUENT ENCOUNTER: Primary | ICD-10-CM

## 2023-12-27 PROCEDURE — 97112 NEUROMUSCULAR REEDUCATION: CPT | Performed by: PHYSICAL THERAPIST

## 2023-12-27 PROCEDURE — 97140 MANUAL THERAPY 1/> REGIONS: CPT | Performed by: PHYSICAL THERAPIST

## 2023-12-27 PROCEDURE — 97110 THERAPEUTIC EXERCISES: CPT | Performed by: PHYSICAL THERAPIST

## 2023-12-27 NOTE — PROGRESS NOTES
[address] [facility]   Phone: [phone]   Fax: [phone]      Physical Therapy Daily Treatment Note    Date: 2023  Patient Name: Akil Dobbs  : 1985   MRN: [id number]  DOInjury: 5 months ago  DOSx: N/A  Referring Provider: MD Zenaida Olguin [address] ,  [facility] Diagnosis:     R hamstring pain    Outcome Measure: LEFS 25%    S: Pt reports that he started new high rep exercise routine for  and this has incr stiffness. O: Please refer to PT Eval  Time 425-896     Visit 5 Repeat outcome measure at mid point and end. Pain 0/10     Strength      Palpation      ROM      Modalities      DN + E-stim  Estim applied to dry needles to promote reduction of neural inflammation, & facilitate proper muscle activation 2023 NR   Dry Needling Hamstrings 2023 MT   Manual      23 MT   Stretch      IT band supine      HS supine      Quad Prone      Exercise       TE   Bike      Stretching HS, SKTC, hip adductor, IT band 10 min  TE   QS      SLR      SAQ      LAQ      Hamstring Curl       TG squats      TG calf raises      Step-ups - FWD      Step-ups - LAT      Step-ups - BWD        NMR To improve balance for safe community and home ambulation    Resisted walk      FWD      BKWD      lat      March      Side stepping      Retro walk      Heel to toe      A:  Tolerated well. Pt was tx today c DN and DN + E-stim to reduce neural inflammation and tissue tightness. Pt was educated on the risks associated with dry needling including pneumothorax and infection. The pts questions were answered and verbal consent given prior to service being provided. Hands were washed and gloves donned. D/t complaints of R HS pain needles were placed in R HS to decr global inflamation and decr local hypertonicity. No adverse reactions were experienced during or post session.   Pt was educated to monitor symptoms over the next few days to Double O-Z Plasty Text: The defect edges were debeveled with a #15 scalpel blade.  Given the location of the defect, shape of the defect and the proximity to free margins a Double O-Z plasty (double transposition flap) was deemed most appropriate.  Using a sterile surgical marker, the appropriate transposition flaps were drawn incorporating the defect and placing the expected incisions within the relaxed skin tension lines where possible. The area thus outlined was incised deep to adipose tissue with a #15 scalpel blade.  The skin margins were undermined to an appropriate distance in all directions utilizing iris scissors.  Hemostasis was achieved with electrocautery.  The flaps were then transposed into place, one clockwise and the other counterclockwise, and anchored with interrupted buried subcutaneous sutures.

## 2024-01-10 ENCOUNTER — TREATMENT (OUTPATIENT)
Dept: PHYSICAL THERAPY | Age: 39
End: 2024-01-10
Payer: OTHER GOVERNMENT

## 2024-01-10 DIAGNOSIS — S76.301D RIGHT HAMSTRING INJURY, SUBSEQUENT ENCOUNTER: Primary | ICD-10-CM

## 2024-01-10 PROCEDURE — 97140 MANUAL THERAPY 1/> REGIONS: CPT | Performed by: PHYSICAL THERAPIST

## 2024-01-10 PROCEDURE — 97112 NEUROMUSCULAR REEDUCATION: CPT | Performed by: PHYSICAL THERAPIST

## 2024-01-10 NOTE — PROGRESS NOTES
[facility]   Phone: [phone]   Fax: [phone]      Physical Therapy Daily Treatment Note    Date: 1/10/2024  Patient Name: Maurilio Lamar  : 1985   MRN: [id number]  DOInjury: 5 months ago  DOSx: N/A  Referring Provider: Boris Cortez MD  [address] [place] [address]     Medical Diagnosis:     R hamstring pain    Outcome Measure: LEFS 25%    S: Pt reports that he continues to have pain in the HS, especially c HS in lengthened position..  O: Please refer to PT Eval  Time 503-307     Visit 6 Repeat outcome measure at mid point and end.    Pain 0/10     Strength      Palpation      ROM      Modalities      DN + E-stim  Estim applied to dry needles to promote reduction of neural inflammation, & facilitate proper muscle activation 1/10/2024 NR   Dry Needling Hamstrings 1/10/2024 MT   Pelvic alignment X10 min Upslip correction on R MT         Manual      23 MT   Stretch      IT band supine      HS supine      Quad Prone      Exercise       TE   Bike      Stretching HS, SKTC, hip adductor, IT band 10 min  TE   QS      SLR      SAQ      LAQ      Hamstring Curl       TG squats      TG calf raises      Step-ups - FWD      Step-ups - LAT      Step-ups - BWD        NMR To improve balance for safe community and home ambulation    Resisted walk      FWD      BKWD      lat      March      Side stepping      Retro walk      Heel to toe      A:  Tolerated well.     Pt was tx today c DN and DN + E-stim to reduce neural inflammation and tissue tightness.  Pt was educated on the risks associated with dry needling including pneumothorax and infection.  The pts questions were answered and verbal consent given prior to service being provided.  Hands were washed and gloves donned. D/t complaints of R HS pain needles were placed in R HS to decr global inflamation and decr local hypertonicity.  No adverse reactions were experienced during or post session.  Pt was

## 2024-02-05 ENCOUNTER — TREATMENT (OUTPATIENT)
Dept: PHYSICAL THERAPY | Age: 39
End: 2024-02-05
Payer: OTHER GOVERNMENT

## 2024-02-05 DIAGNOSIS — S76.301D RIGHT HAMSTRING INJURY, SUBSEQUENT ENCOUNTER: Primary | ICD-10-CM

## 2024-02-05 PROCEDURE — 97112 NEUROMUSCULAR REEDUCATION: CPT | Performed by: PHYSICAL THERAPIST

## 2024-02-05 PROCEDURE — 97530 THERAPEUTIC ACTIVITIES: CPT | Performed by: PHYSICAL THERAPIST

## 2024-02-05 PROCEDURE — 97110 THERAPEUTIC EXERCISES: CPT | Performed by: PHYSICAL THERAPIST

## 2024-02-05 PROCEDURE — 97140 MANUAL THERAPY 1/> REGIONS: CPT | Performed by: PHYSICAL THERAPIST

## 2024-02-05 NOTE — PROGRESS NOTES
[facility]   Phone: [phone]   Fax: [phone]      Physical Therapy Daily Treatment Note    Date: 2024  Patient Name: Maurilio Lamar  : 1985   MRN: [id number]  DOInjury: 5 months ago  DOSx: N/A  Referring Provider: Boris Cortez MD  [address] [place] [address]     Medical Diagnosis:     R hamstring pain    Outcome Measure: LEFS 25%    S: Pt reports that he was able to play basketball this weekend s any major HS pain.   O: Please refer to PT Eval  Time 089-487     Visit 7 Repeat outcome measure at mid point and end.    Pain 0/10     Strength      Palpation      ROM      Modalities      DN + E-stim  Estim applied to dry needles to promote reduction of neural inflammation, & facilitate proper muscle activation 2024 NR   Dry Needling Hamstrings 2024 MT   Upslip correction on R MT         Manual      23 MT   Stretch      IT band supine      HS supine      Quad Prone      Exercise       TE   Bike      Stretching HS, SKTC, hip adductor, IT band, piriformis X15 min  TE   Bridging, bird dogs, TM walk/jog X15 min  TA                                                           NMR To improve balance for safe community and home ambulation    Resisted walk      FWD      BKWD      lat      March      Side stepping      Retro walk      Heel to toe      A:  Tolerated well.     Pt was tx today c DN and DN + E-stim to reduce neural inflammation and tissue tightness.  Pt was educated on the risks associated with dry needling including pneumothorax and infection.  The pts questions were answered and verbal consent given prior to service being provided.  Hands were washed and gloves donned. D/t complaints of R HS pain needles were placed in R HS to decr global inflamation and decr local hypertonicity.  No adverse reactions were experienced during or post session.  Pt was educated to monitor symptoms over the next few days to determine if adverse affects

## 2024-02-28 ENCOUNTER — TREATMENT (OUTPATIENT)
Dept: PHYSICAL THERAPY | Age: 39
End: 2024-02-28
Payer: OTHER GOVERNMENT

## 2024-02-28 DIAGNOSIS — S76.301D RIGHT HAMSTRING INJURY, SUBSEQUENT ENCOUNTER: Primary | ICD-10-CM

## 2024-02-28 PROCEDURE — 97112 NEUROMUSCULAR REEDUCATION: CPT | Performed by: PHYSICAL THERAPIST

## 2024-02-28 PROCEDURE — 97140 MANUAL THERAPY 1/> REGIONS: CPT | Performed by: PHYSICAL THERAPIST

## 2024-02-28 NOTE — PROGRESS NOTES
[facility]   Phone: [phone]   Fax: [phone]      Physical Therapy Daily Treatment Note    Date: 2024  Patient Name: Maurilio Lamar  : 1985   MRN: [id number]  DOInjury: 5 months ago  DOSx: N/A  Referring Provider: No referring provider defined for this encounter.     Medical Diagnosis:     R hamstring pain    Outcome Measure: LEFS 25%    S: Pt reports that he was working out getting ready for [employer] school and had significant incr in LBP. He has been debilitated for a week unable to perform basic work functions secondary to pain.  O: Please refer to PT Eval  Time 810-910     Visit 8 Repeat outcome measure at mid point and end.    Pain 0/10     Strength      Palpation      ROM      Modalities      DN + E-stim  Estim applied to dry needles to promote reduction of neural inflammation, & facilitate proper muscle activation 2024 NR   Dry Needling Hamstrings 2024 MT   Upslip correction on R MT   Lumbar mobs X15 min L unilaterals MT               Manual      23 MT   Stretch      IT band supine      HS supine      Quad Prone      Exercise       TE   Bike       TE    TA                                                           NMR To improve balance for safe community and home ambulation    Resisted walk      FWD      BKWD      lat      March      Side stepping      Retro walk      Heel to toe      A:  Tolerated well.     Pt was tx today c DN and DN + E-stim to reduce neural inflammation and tissue tightness.  Pt was educated on the risks associated with dry needling including pneumothorax and infection.  The pts questions were answered and verbal consent given prior to service being provided.  Hands were washed and gloves donned. D/t complaints of R HS pain needles were placed in R HS to decr global inflamation and decr local hypertonicity.  No adverse reactions were experienced during or post session.  Pt was educated to monitor symptoms over the next few

## 2024-04-09 ENCOUNTER — TREATMENT (OUTPATIENT)
Dept: PHYSICAL THERAPY | Age: 39
End: 2024-04-09
Payer: OTHER GOVERNMENT

## 2024-04-09 DIAGNOSIS — M25.511 RIGHT SHOULDER PAIN, UNSPECIFIED CHRONICITY: Primary | ICD-10-CM

## 2024-04-09 PROCEDURE — 97140 MANUAL THERAPY 1/> REGIONS: CPT | Performed by: PHYSICAL THERAPIST

## 2024-04-09 PROCEDURE — 97112 NEUROMUSCULAR REEDUCATION: CPT | Performed by: PHYSICAL THERAPIST

## 2024-04-09 PROCEDURE — 97161 PT EVAL LOW COMPLEX 20 MIN: CPT | Performed by: PHYSICAL THERAPIST

## 2024-04-09 NOTE — PROGRESS NOTES
[facility]   Phone: [phone]   Fax: [phone]      Physical Therapy Daily Treatment Note    Date: 2024  Patient Name: Maurilio Lamar  : 1985   MRN: [id number]  DOInjury: 18 days  DOSx: NA   Referring Provider: Boris Cortez MD  [address] [place] [address]     Medical Diagnosis:     R shoulder pain    Outcome Measure: QuickDASH 10%    S: see eval  O: Pt given written HEP  Time 4072-2400     Visit  Repeat outcome measure at mid point and end.    Pain 4/10                       Modalities            Manual      mobilizations X10 min 4 direction MT         Stretch      Table slides      Wall Flexion      Wall ER stretch      Towel IR stretch      IR reaching behind back      Exercise      SB push ups 3x10 10s hols  NR   SB alphabet x3  NR   Plank rows 3x10 B 10 lbs NR   Plank shoulder ext X10 B NW NR                                                                     ROWS: H Functional activities  To aid in ROM and strength needed for reaching , lifting ,pushing and pulling at home/work    ROWS: M  \"    ROWS: L  \"    ER  \"    IR  \"                A:  Tolerated well.  Above added to written HEP and will perform daily. He will continue c capsular stretches and will begin CKC stability program. Mobilizations performed today to reduce impingement syndrome and improve shoulder mechanics at end range.  P: Continue with rehab plan  Toan Rubio, PT DPT, PT [id number]    Treatment Charges: Mins Units   Initial Evaluation 15 1   Re-Evaluation     Ther Exercise         TE     Manual Therapy     MT 10 1   Ther Activities        TA     Gait Training          GT     Neuro Re-education NR 15 1   Modalities     Non-Billable Service Time     Other     Total Time/Units 40 3      
days per week for 4-6 weeks.   Certification period from 4/9/2024  to 7/9/2024.    I have reviewed the Plan of Care established for skilled therapy services and certify that the services are required and that they will be provided while the patient is under my care.    Physician's Comments/Revisions:               Physician's Printed Name:                                           Physician's Signature:                                                               Date:

## 2024-04-29 ENCOUNTER — TREATMENT (OUTPATIENT)
Dept: PHYSICAL THERAPY | Age: 39
End: 2024-04-29
Payer: OTHER GOVERNMENT

## 2024-04-29 DIAGNOSIS — M25.511 RIGHT SHOULDER PAIN, UNSPECIFIED CHRONICITY: Primary | ICD-10-CM

## 2024-04-29 PROCEDURE — 97112 NEUROMUSCULAR REEDUCATION: CPT | Performed by: PHYSICAL THERAPIST
